# Patient Record
Sex: FEMALE | Race: WHITE | NOT HISPANIC OR LATINO | Employment: FULL TIME | URBAN - METROPOLITAN AREA
[De-identification: names, ages, dates, MRNs, and addresses within clinical notes are randomized per-mention and may not be internally consistent; named-entity substitution may affect disease eponyms.]

---

## 2024-04-09 ENCOUNTER — HOSPITAL ENCOUNTER (EMERGENCY)
Facility: CLINIC | Age: 54
Discharge: HOME | End: 2024-04-09
Attending: EMERGENCY MEDICINE | Admitting: EMERGENCY MEDICINE
Payer: COMMERCIAL

## 2024-04-09 VITALS
BODY MASS INDEX: 31.07 KG/M2 | TEMPERATURE: 99 F | SYSTOLIC BLOOD PRESSURE: 145 MMHG | HEART RATE: 70 BPM | RESPIRATION RATE: 20 BRPM | WEIGHT: 205 LBS | DIASTOLIC BLOOD PRESSURE: 90 MMHG | HEIGHT: 68 IN | OXYGEN SATURATION: 98 %
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
## 2024-04-09 PROCEDURE — 99283 EMERGENCY DEPT VISIT LOW MDM: CPT

## 2024-04-09 PROCEDURE — 250N000013 HC RX MED GY IP 250 OP 250 PS 637: Performed by: EMERGENCY MEDICINE

## 2024-04-09 RX ADMIN — AMOXICILLIN AND CLAVULANATE POTASSIUM 1 TABLET: 875; 125 TABLET, FILM COATED ORAL at 21:15

## 2024-04-09 ASSESSMENT — ACTIVITIES OF DAILY LIVING (ADL): ADLS_ACUITY_SCORE: 35

## 2024-04-09 ASSESSMENT — COLUMBIA-SUICIDE SEVERITY RATING SCALE - C-SSRS
6. HAVE YOU EVER DONE ANYTHING, STARTED TO DO ANYTHING, OR PREPARED TO DO ANYTHING TO END YOUR LIFE?: NO
1. IN THE PAST MONTH, HAVE YOU WISHED YOU WERE DEAD OR WISHED YOU COULD GO TO SLEEP AND NOT WAKE UP?: NO
2. HAVE YOU ACTUALLY HAD ANY THOUGHTS OF KILLING YOURSELF IN THE PAST MONTH?: NO

## 2024-04-10 NOTE — ED TRIAGE NOTES
Bit by neighbors cat tonight on right index finger. Unsure of tetanus status     Triage Assessment (Adult)       Row Name 04/09/24 2102          Triage Assessment    Airway WDL WDL        Respiratory WDL    Respiratory WDL WDL        Skin Circulation/Temperature WDL    Skin Circulation/Temperature WDL X  right index finger cat bite        Cardiac WDL    Cardiac WDL WDL        Peripheral/Neurovascular WDL    Peripheral Neurovascular WDL WDL        Cognitive/Neuro/Behavioral WDL    Cognitive/Neuro/Behavioral WDL WDL

## 2024-04-10 NOTE — ED PROVIDER NOTES
"  History     Chief Complaint:  Cat Bite       The history is provided by the patient.      Shelley Nunes is a 53 year old female with rheumatoid arthritis who presents for evaluation of a cat bite. The patient reports that she is cat sitting for her neighbor when the cat bit her right hand. She was bit around 1930. She has rheumatoid arthritis. She is left handed.     Independent Historian:   None - Patient Only    Review of External Notes:   None      Medications:    Hydroxychloroquine  Sulfasalazine  Progesterone micronized    Past Medical History:    No past medical history on file.    Past Surgical History:    No past surgical history on file.     Physical Exam   Patient Vitals for the past 24 hrs:   BP Temp Temp src Pulse Resp SpO2 Height Weight   04/09/24 2103 (!) 145/90 99  F (37.2  C) Temporal 70 20 98 % 1.727 m (5' 8\") 93 kg (205 lb)        Physical Exam  MSK:  Normal movement through the elbow, wrist, and fingers without tendonous deficit.    SKIN:  Warm and dry with strong radial pulse and normal capillary refill. There is evidence of a puncture wound to the proximal aspect of the right index finger.     NEURO:  5/5 strength through the fingers/wrist/elbow.  Normal sensation through the radial/ulnar/median nerve distributions.    PSYCH:  Normal affect      Emergency Department Course     Emergency Department Course & Assessments:    Interventions:  Medications   amoxicillin-clavulanate (AUGMENTIN) 875-125 MG per tablet 1 tablet (has no administration in time range)      Independent Interpretation (X-rays, CTs, rhythm strip):  None    Assessments/Consultations/Discussion of Management or Tests:   ED Course as of 04/09/24 2109 Tue Apr 09, 2024 2109 I examined the patient and obtained history.          Social Determinants of Health affecting care:   None    Disposition:  The patient was discharged.     Impression & Plan      MIPS (If applicable):  N/A    Medical Decision Making:  This healthy middle-age " woman presents after being bitten by her neighbors cat.  The injury is fairly mild and does not require significant intervention.  However, we will place her on 3 days of prophylactic Augmentin.  We discussed appropriate wound care.  Questions were answered and she is comfortable plan for discharge.      Diagnosis:  No diagnosis found.     Discharge Medications:  New Prescriptions    No medications on file          Scribe Disclosure:  I, Richard Sidhu, am serving as a scribe at 9:08 PM on 4/9/2024 to document services personally performed by Trierweiler, Chad A, MD based on my observations and the provider's statements to me.     4/9/2024   Trierweiler, Chad A, MD Trierweiler, Chad A, MD  04/10/24 1509